# Patient Record
Sex: MALE | Race: WHITE | NOT HISPANIC OR LATINO | Employment: FULL TIME | URBAN - METROPOLITAN AREA
[De-identification: names, ages, dates, MRNs, and addresses within clinical notes are randomized per-mention and may not be internally consistent; named-entity substitution may affect disease eponyms.]

---

## 2021-10-28 ENCOUNTER — TRANSCRIBE ORDERS (OUTPATIENT)
Dept: ADMINISTRATIVE | Facility: HOSPITAL | Age: 46
End: 2021-10-28

## 2021-10-28 DIAGNOSIS — R07.9 CHEST PAIN, UNSPECIFIED TYPE: Primary | ICD-10-CM

## 2021-11-26 ENCOUNTER — APPOINTMENT (OUTPATIENT)
Dept: NUCLEAR MEDICINE | Facility: HOSPITAL | Age: 46
End: 2021-11-26

## 2021-12-22 ENCOUNTER — APPOINTMENT (OUTPATIENT)
Dept: NUCLEAR MEDICINE | Facility: HOSPITAL | Age: 46
End: 2021-12-22

## 2022-04-18 ENCOUNTER — OFFICE VISIT (OUTPATIENT)
Dept: CARDIOLOGY | Facility: CLINIC | Age: 47
End: 2022-04-18

## 2022-04-18 VITALS
DIASTOLIC BLOOD PRESSURE: 93 MMHG | OXYGEN SATURATION: 77 % | HEIGHT: 65 IN | HEART RATE: 84 BPM | WEIGHT: 203 LBS | RESPIRATION RATE: 18 BRPM | BODY MASS INDEX: 33.82 KG/M2 | SYSTOLIC BLOOD PRESSURE: 156 MMHG

## 2022-04-18 DIAGNOSIS — E78.5 HYPERLIPIDEMIA LDL GOAL <100: ICD-10-CM

## 2022-04-18 DIAGNOSIS — E78.2 ELEVATED CHOLESTEROL WITH HIGH TRIGLYCERIDES: ICD-10-CM

## 2022-04-18 DIAGNOSIS — R07.9 CHEST PAIN, UNSPECIFIED TYPE: Primary | ICD-10-CM

## 2022-04-18 PROCEDURE — 99203 OFFICE O/P NEW LOW 30 MIN: CPT | Performed by: INTERNAL MEDICINE

## 2022-04-18 PROCEDURE — 93000 ELECTROCARDIOGRAM COMPLETE: CPT | Performed by: INTERNAL MEDICINE

## 2022-04-18 RX ORDER — TIZANIDINE 4 MG/1
TABLET ORAL
COMMUNITY
Start: 2022-02-21

## 2022-04-18 NOTE — PROGRESS NOTES
"Cardiology Office Visit      Encounter Date:  04/18/2022    Patient ID:   Rodolfo Jacobsen is a 46 y.o. male.    Reason For Followup:  Chest pain  Hyperlipidemia    Brief Clinical History:  Dear Maximiliano Jesus MD    I had the pleasure of seeing Rodolfo Jacobsen today. As you are well aware, this is a 46 y.o. male past medical history that is significant for family history of coronary artery disease having some intermittent chest pain for the last several months progressively getting worse recently    Interval History:  Intermittent substernal chest pain with radiation to the neck  No dizziness no syncope no orthopnea no PND  No cardiac work-up or evaluation  \No orthopnea no PND  Worsening of the symptoms in the last several weeks    Assessment & Plan    Impressions:  Chest pain  Shortness of breath  Hyperlipidemia with triglycerides more than 600  Family history of kidney artery disease    Recommendations:  Schedule for echocardiogram to rule out pericardial pathology with intermittent ongoing long-term symptoms that lasted for a year rule out any pericardial effusion or structural heart disease contributing to patient's symptoms  Schedule for a plain treadmill stress test for further stratification with family history of coronary artery disease and intermittent chest discomfort  Need for exercise and dietary changes for hypertriglyceridemia and possible need for pharmacological therapy reviewed and discussed with patient  He prefers to try lifestyle changes along with fish oil supplements 1 g p.o. twice daily  Recheck lipids in 4 weeks  Continue current medical therapy continue aggressive risk factor modification  Follow-up in office in 3 months    Objective:    Vitals:  Vitals:    04/18/22 1512   BP: 156/93   BP Location: Left arm   Patient Position: Sitting   Cuff Size: Large Adult   Pulse: 84   Resp: 18   SpO2: (!) 77%   Weight: 92.1 kg (203 lb)   Height: 165.1 cm (65\")       Physical Exam:    General: Alert, " cooperative, no distress, appears stated age  Head:  Normocephalic, atraumatic, mucous membranes moist  Eyes:  Conjunctiva/corneas clear, EOM's intact     Neck:  Supple,  no adenopathy;      Lungs: Clear to auscultation bilaterally, no wheezes rhonchi rales are noted  Chest wall: No tenderness  Heart::  Regular rate and rhythm, S1 and S2 normal, no murmur, rub or gallop  Abdomen: Soft, non-tender, nondistended bowel sounds active  Extremities: No cyanosis, clubbing, or edema  Pulses: 2+ and symmetric all extremities  Skin:  No rashes or lesions  Neuro/psych: A&O x3. CN II through XII are grossly intact with appropriate affect      Allergies:  No Known Allergies    Medication Review:     Current Outpatient Medications:   •  tiZANidine (ZANAFLEX) 4 MG tablet, TAKE ONE-HALF TABLET BY MOUTH IN THE AFTERNOON AND ONE WHOLE TABLET BY MOUTH AT BEDTIME, Disp: , Rfl:     Family History:  Family History   Problem Relation Age of Onset   • Heart disease Father        Past Medical History:  History reviewed. No pertinent past medical history.    Past surgical History:  Past Surgical History:   Procedure Laterality Date   • WISDOM TOOTH EXTRACTION         Social History:  Social History     Socioeconomic History   • Marital status:    Tobacco Use   • Smoking status: Never Smoker   • Smokeless tobacco: Never Used   Vaping Use   • Vaping Use: Never used   Substance and Sexual Activity   • Alcohol use: Never   • Drug use: Never   • Sexual activity: Yes       Review of Systems:  The following systems were reviewed as they relate to the cardiovascular system: Constitutional, Eyes, ENT, Cardiovascular, Respiratory, Gastrointestinal, Integumentary, Neurological, Psychiatric, Hematologic, Endocrine, Musculoskeletal, and Genitourinary. The pertinent cardiovascular findings are reported above with all other cardiovascular points within those systems being negative.    Diagnostic Study Review:     Current Electrocardiogram:    ECG 12  Lead    Date/Time: 4/18/2022 3:58 PM  Performed by: Danielle Culver MD  Authorized by: Danielle Culver MD   Comparison: compared with previous ECG   Similar to previous ECG  Rhythm: sinus rhythm  Rate: normal  BPM: 84  Conduction: conduction normal  QRS axis: normal  Other findings: non-specific ST-T wave changes    Clinical impression: abnormal EKG              NOTE: The following portions of the patient's history were reviewed and updated this visit as appropriate: allergies, current medications, past family history, past medical history, past social history, past surgical history and problem list.

## 2022-06-06 ENCOUNTER — HOSPITAL ENCOUNTER (OUTPATIENT)
Dept: CARDIOLOGY | Facility: HOSPITAL | Age: 47
Discharge: HOME | End: 2022-06-06

## 2022-06-06 VITALS
BODY MASS INDEX: 33.82 KG/M2 | WEIGHT: 203 LBS | SYSTOLIC BLOOD PRESSURE: 149 MMHG | HEART RATE: 80 BPM | HEIGHT: 65 IN | DIASTOLIC BLOOD PRESSURE: 98 MMHG

## 2022-06-06 DIAGNOSIS — R07.9 CHEST PAIN, UNSPECIFIED TYPE: ICD-10-CM

## 2022-06-06 PROCEDURE — 93306 TTE W/DOPPLER COMPLETE: CPT

## 2022-06-06 PROCEDURE — 93306 TTE W/DOPPLER COMPLETE: CPT | Performed by: INTERNAL MEDICINE

## 2022-06-06 PROCEDURE — 93016 CV STRESS TEST SUPVJ ONLY: CPT | Performed by: INTERNAL MEDICINE

## 2022-06-06 PROCEDURE — 93018 CV STRESS TEST I&R ONLY: CPT | Performed by: INTERNAL MEDICINE

## 2022-06-06 PROCEDURE — 93017 CV STRESS TEST TRACING ONLY: CPT

## 2022-06-10 LAB
ASCENDING AORTA: 2.6 CM
BH CV ECHO MEAS - ACS: 1.88 CM
BH CV ECHO MEAS - AO MAX PG: 7.1 MMHG
BH CV ECHO MEAS - AO MEAN PG: 3.8 MMHG
BH CV ECHO MEAS - AO ROOT DIAM: 2.9 CM
BH CV ECHO MEAS - AO V2 MAX: 132.6 CM/SEC
BH CV ECHO MEAS - AO V2 VTI: 23.9 CM
BH CV ECHO MEAS - AVA(I,D): 2.38 CM2
BH CV ECHO MEAS - EDV(CUBED): 113.7 ML
BH CV ECHO MEAS - EDV(MOD-SP2): 106.3 ML
BH CV ECHO MEAS - EDV(MOD-SP4): 92.9 ML
BH CV ECHO MEAS - EF(MOD-BP): 58 %
BH CV ECHO MEAS - EF(MOD-SP2): 60.8 %
BH CV ECHO MEAS - EF(MOD-SP4): 54 %
BH CV ECHO MEAS - ESV(CUBED): 40.1 ML
BH CV ECHO MEAS - ESV(MOD-SP2): 41.7 ML
BH CV ECHO MEAS - ESV(MOD-SP4): 42.7 ML
BH CV ECHO MEAS - FS: 29.4 %
BH CV ECHO MEAS - IVS/LVPW: 0.97 CM
BH CV ECHO MEAS - IVSD: 1.07 CM
BH CV ECHO MEAS - LA DIMENSION: 3 CM
BH CV ECHO MEAS - LAT PEAK E' VEL: 12 CM/SEC
BH CV ECHO MEAS - LV DIASTOLIC VOL/BSA (35-75): 46.7 CM2
BH CV ECHO MEAS - LV MASS(C)D: 192.5 GRAMS
BH CV ECHO MEAS - LV MAX PG: 4.4 MMHG
BH CV ECHO MEAS - LV MEAN PG: 2.5 MMHG
BH CV ECHO MEAS - LV SYSTOLIC VOL/BSA (12-30): 21.5 CM2
BH CV ECHO MEAS - LV V1 MAX: 104.8 CM/SEC
BH CV ECHO MEAS - LV V1 VTI: 20.1 CM
BH CV ECHO MEAS - LVIDD: 4.8 CM
BH CV ECHO MEAS - LVIDS: 3.4 CM
BH CV ECHO MEAS - LVOT AREA: 2.8 CM2
BH CV ECHO MEAS - LVOT DIAM: 1.9 CM
BH CV ECHO MEAS - LVPWD: 1.1 CM
BH CV ECHO MEAS - MED PEAK E' VEL: 8 CM/SEC
BH CV ECHO MEAS - MV A MAX VEL: 62.9 CM/SEC
BH CV ECHO MEAS - MV DEC SLOPE: 374.5 CM/SEC2
BH CV ECHO MEAS - MV DEC TIME: 0.22 MSEC
BH CV ECHO MEAS - MV E MAX VEL: 80.8 CM/SEC
BH CV ECHO MEAS - MV E/A: 1.28
BH CV ECHO MEAS - MV MAX PG: 2.6 MMHG
BH CV ECHO MEAS - MV MEAN PG: 1.11 MMHG
BH CV ECHO MEAS - MV P1/2T: 47 MSEC
BH CV ECHO MEAS - MV V2 VTI: 19.1 CM
BH CV ECHO MEAS - MVA(P1/2T): 4.7 CM2
BH CV ECHO MEAS - MVA(VTI): 3 CM2
BH CV ECHO MEAS - PA ACC SLOPE: 520 CM/SEC2
BH CV ECHO MEAS - PA ACC TIME: 0.13 SEC
BH CV ECHO MEAS - PA PR(ACCEL): 18.8 MMHG
BH CV ECHO MEAS - PA V2 MAX: 105.4 CM/SEC
BH CV ECHO MEAS - PULM A REVS DUR: 0.11 SEC
BH CV ECHO MEAS - PULM A REVS VEL: 34 CM/SEC
BH CV ECHO MEAS - PULM DIAS VEL: 55.1 CM/SEC
BH CV ECHO MEAS - PULM S/D: 0.99
BH CV ECHO MEAS - PULM SYS VEL: 54.8 CM/SEC
BH CV ECHO MEAS - RAP SYSTOLE: 3 MMHG
BH CV ECHO MEAS - RV MAX PG: 2.03 MMHG
BH CV ECHO MEAS - RV V1 MAX: 71.3 CM/SEC
BH CV ECHO MEAS - RV V1 VTI: 14.9 CM
BH CV ECHO MEAS - RVSP: 28.6 MMHG
BH CV ECHO MEAS - SI(MOD-SP2): 32.4 ML/M2
BH CV ECHO MEAS - SI(MOD-SP4): 25.2 ML/M2
BH CV ECHO MEAS - SUP REN AO DIAM: 2 CM
BH CV ECHO MEAS - SV(LVOT): 56.8 ML
BH CV ECHO MEAS - SV(MOD-SP2): 64.6 ML
BH CV ECHO MEAS - SV(MOD-SP4): 50.2 ML
BH CV ECHO MEAS - TAPSE (>1.6): 2.2 CM
BH CV ECHO MEAS - TR MAX PG: 25.6 MMHG
BH CV ECHO MEAS - TR MAX VEL: 252.5 CM/SEC
BH CV ECHO MEASUREMENTS AVERAGE E/E' RATIO: 8.08
BH CV STRESS BP STAGE 1: NORMAL
BH CV STRESS BP STAGE 2: NORMAL
BH CV STRESS BP STAGE 3: NORMAL
BH CV STRESS DURATION MIN STAGE 1: 3
BH CV STRESS DURATION MIN STAGE 2: 3
BH CV STRESS DURATION MIN STAGE 3: 1
BH CV STRESS DURATION SEC STAGE 1: 0
BH CV STRESS DURATION SEC STAGE 2: 0
BH CV STRESS DURATION SEC STAGE 3: 26
BH CV STRESS GRADE STAGE 1: 10
BH CV STRESS GRADE STAGE 2: 12
BH CV STRESS GRADE STAGE 3: 14
BH CV STRESS HR STAGE 1: 115
BH CV STRESS HR STAGE 2: 136
BH CV STRESS HR STAGE 3: 150
BH CV STRESS METS STAGE 1: 5
BH CV STRESS METS STAGE 2: 7.5
BH CV STRESS METS STAGE 3: 10.1
BH CV STRESS PROTOCOL 1: NORMAL
BH CV STRESS RECOVERY BP: NORMAL MMHG
BH CV STRESS RECOVERY HR: 113 BPM
BH CV STRESS SPEED STAGE 1: 1.7
BH CV STRESS SPEED STAGE 2: 2.5
BH CV STRESS SPEED STAGE 3: 3.4
BH CV STRESS STAGE 1: 1
BH CV STRESS STAGE 2: 2
BH CV STRESS STAGE 3: 3
BH CV VAS BP LEFT ARM: NORMAL MMHG
BH CV XLRA - RV BASE: 3.2 CM
BH CV XLRA - RV MID: 2.2 CM
BH CV XLRA - TDI S': 15 CM/SEC
IVRT: 71 MSEC
LEFT ATRIUM VOLUME INDEX: 18 ML/M2
LEFT ATRIUM VOLUME: 36 ML
MAXIMAL PREDICTED HEART RATE: 176 BPM
MAXIMAL PREDICTED HEART RATE: 176 BPM
PERCENT MAX PREDICTED HR: 85.23 %
STRESS BASELINE BP: NORMAL MMHG
STRESS BASELINE HR: 86 BPM
STRESS PERCENT HR: 100 %
STRESS POST ESTIMATED WORKLOAD: 10.1 METS
STRESS POST EXERCISE DUR MIN: 7 MIN
STRESS POST EXERCISE DUR SEC: 25 SEC
STRESS POST PEAK BP: NORMAL MMHG
STRESS POST PEAK HR: 150 BPM
STRESS TARGET HR: 150 BPM
STRESS TARGET HR: 150 BPM

## 2022-06-13 ENCOUNTER — TELEPHONE (OUTPATIENT)
Dept: CARDIOLOGY | Facility: CLINIC | Age: 47
End: 2022-06-13

## 2022-06-13 ENCOUNTER — CLINICAL SUPPORT (OUTPATIENT)
Dept: FAMILY MEDICINE CLINIC | Facility: CLINIC | Age: 47
End: 2022-06-13

## 2022-06-13 DIAGNOSIS — R07.9 CHEST PAIN, UNSPECIFIED TYPE: ICD-10-CM

## 2022-06-13 DIAGNOSIS — E78.5 HYPERLIPIDEMIA LDL GOAL <100: ICD-10-CM

## 2022-06-13 PROCEDURE — 80061 LIPID PANEL: CPT | Performed by: INTERNAL MEDICINE

## 2022-06-13 PROCEDURE — 36415 COLL VENOUS BLD VENIPUNCTURE: CPT | Performed by: NURSE PRACTITIONER

## 2022-06-13 NOTE — PROGRESS NOTES
Venipuncture Blood Specimen Collection  Venipuncture performed in R arm by RAND CANCINO MA with good hemostasis. Patient tolerated the procedure well without complications.   06/13/22   RAND CANCINO MA

## 2022-06-13 NOTE — TELEPHONE ENCOUNTER
Unable to reach pt - mailbox was full and unable to receive messages.     ----- Message from Danielle Culver MD sent at 6/12/2022 10:43 AM EDT -----  Normal stress test

## 2022-06-14 ENCOUNTER — TELEPHONE (OUTPATIENT)
Dept: CARDIOLOGY | Facility: CLINIC | Age: 47
End: 2022-06-14

## 2022-06-14 LAB
CHOLEST SERPL-MCNC: 229 MG/DL (ref 0–200)
HDLC SERPL-MCNC: 40 MG/DL (ref 40–60)
LDLC SERPL CALC-MCNC: 131 MG/DL (ref 0–100)
LDLC/HDLC SERPL: 3.1 {RATIO}
TRIGL SERPL-MCNC: 325 MG/DL (ref 0–150)
VLDLC SERPL-MCNC: 58 MG/DL (ref 5–40)

## 2022-06-14 RX ORDER — ATORVASTATIN CALCIUM 10 MG/1
10 TABLET, FILM COATED ORAL NIGHTLY
Qty: 30 TABLET | Refills: 11 | Status: SHIPPED | OUTPATIENT
Start: 2022-06-14

## 2022-06-14 NOTE — TELEPHONE ENCOUNTER
Called and notified, patient verbalized understanding.    --------------------------    Dnaielle Culver MD Melissa, MA    Lipids are elevated   If patient is agreeable consider starting patient on Lipitor 10 mg p.o. nightly   If he is not sure I will discuss with him next office visit

## 2022-12-19 ENCOUNTER — CONSULT (OUTPATIENT)
Dept: CARDIOLOGY | Facility: CLINIC | Age: 47
End: 2022-12-19
Payer: COMMERCIAL

## 2022-12-19 VITALS
HEIGHT: 65 IN | SYSTOLIC BLOOD PRESSURE: 148 MMHG | OXYGEN SATURATION: 99 % | HEART RATE: 80 BPM | DIASTOLIC BLOOD PRESSURE: 95 MMHG | WEIGHT: 199 LBS | BODY MASS INDEX: 33.15 KG/M2

## 2022-12-19 DIAGNOSIS — R07.9 CHEST PAIN, UNSPECIFIED TYPE: Primary | ICD-10-CM

## 2022-12-19 PROCEDURE — 99213 OFFICE O/P EST LOW 20 MIN: CPT | Performed by: INTERNAL MEDICINE

## 2022-12-19 RX ORDER — NICOTINE POLACRILEX 4 MG/1
1 GUM, CHEWING ORAL DAILY
COMMUNITY
Start: 2022-12-07

## 2022-12-19 NOTE — LETTER
January 8, 2023     STEPHY Nascimento  [address] [facility] [place] [address]    Patient: Rodolfo Jacobsen   YOB: 1975   Date of Visit: 12/19/2022       Dear STEPHY Juan:    Thank you for referring Rodolfo Jacobsen to me for evaluation. Below are the relevant portions of my assessment and plan of care.    If you have questions, please do not hesitate to call me. I look forward to following Rodolfo along with you.         Sincerely,        Jet Christopher,         CC: No Recipients  Jet Christopher DO  01/08/23 1720  Signed  Cardiology Office Visit      Encounter Date:  12/19/2022    Patient ID:   Rodolfo Jacobsen is a 47 y.o. male.    Reason For Followup:  Chest pain    Brief Clinical History:  Dear Yoana Juan APRN    I had the pleasure of seeing Rodolfo Jacobsen today. As you are well aware, this is a 47 y.o. male with no established history of ischemic heart disease.  He does have a history of acid reflux and hyperlipidemia.  He presents today for the evaluation of chest pain.    Interval History:  He reports that he is had some chest discomfort on and off for about 2 years.  He also has some neck discomfort as well.  He denies any shortness of breath.  He denies any PND orthopnea.  He denies any syncope or near syncope.    He underwent an ischemic work-up earlier in 2022.  This included a treadmill stress test and a echocardiogram.  His treadmill stress test was negative for ischemia.  His echocardiogram demonstrated normal left ventricular systolic function with an EF of 55 to 60% and trace mitral regurgitation.    The patient reports that his discomfort is not exertional.  In fact he has no issues with a brisk walk or when he lifts weights.  He does have some discomfort along the side of his jaw.  He does have some tension type discomfort in the back of his neck.    Given that he is undergone an ischemic work-up earlier this year and my recommendation is to have a  gastroenterology work-up first.  If the GI work-up fails to demonstrate any significant gastrointestinal etiology then we can consider cardiac catheterization.    Assessment & Plan    Impressions:  Chest pain with predominantly atypical features  Acid reflux    Recommendations:  Discussed increasing acid reduction medications with PCP  Discuss endoscopy/GI referral with PCP  Follow-up in 6 months    Diagnoses and all orders for this visit:    1. Chest pain, unspecified type (Primary)          Objective:    Vitals:  Vitals:    12/19/22 1124   BP: 148/95   Pulse: 80   SpO2: 99%   Weight: 90.3 kg (199 lb)   Height: 165.1 cm (65\")     Body mass index is 33.12 kg/m².      Physical Exam:    General: Alert, cooperative, no distress, appears stated age  Head:  Normocephalic, atraumatic, mucous membranes moist  Eyes:  Conjunctiva/corneas clear, EOM's intact     Neck:  Supple,  no bruit    Lungs: Clear to auscultation bilaterally, no wheezes rhonchi rales are noted  Chest wall: No tenderness  Heart::  Regular rate and rhythm, S1 and S2 normal, 1/6 holosystolic murmur.  No rub or gallop  Abdomen: Soft, non-tender, nondistended bowel sounds active  Extremities: No cyanosis, clubbing, or edema  Pulses: 2+ and symmetric all extremities  Skin:  No rashes or lesions  Neuro/psych: A&O x3. CN II through XII are grossly intact with appropriate affect      Allergies:  No Known Allergies    Medication Review:     Current Outpatient Medications:   •  atorvastatin (LIPITOR) 10 MG tablet, Take 1 tablet by mouth Every Night., Disp: 30 tablet, Rfl: 11  •  Omeprazole 20 MG tablet delayed-release, Take 1 tablet by mouth Daily., Disp: , Rfl:   •  tiZANidine (ZANAFLEX) 4 MG tablet, TAKE ONE-HALF TABLET BY MOUTH IN THE AFTERNOON AND ONE WHOLE TABLET BY MOUTH AT BEDTIME, Disp: , Rfl:     Family History:  Family History   Problem Relation Age of Onset   • Hyperlipidemia Mother    • Hyperlipidemia Father    • Heart disease Father         He had a  stint put in 1998.   • Heart disease Paternal Grandfather        Past Medical History:  History reviewed. No pertinent past medical history.    Past Surgical History:  Past Surgical History:   Procedure Laterality Date   • WISDOM TOOTH EXTRACTION         Social History:  Social History     Socioeconomic History   • Marital status:    Tobacco Use   • Smoking status: Never   • Smokeless tobacco: Never   Vaping Use   • Vaping Use: Never used   Substance and Sexual Activity   • Alcohol use: Never   • Drug use: Never   • Sexual activity: Yes     Partners: Female       Review of Systems:  The following systems were reviewed as they relate to the cardiovascular system: Constitutional, Eyes, ENT, Cardiovascular, Respiratory, Gastrointestinal, Integumentary, Neurological, Psychiatric, Hematologic, Endocrine, Musculoskeletal, and Genitourinary. The pertinent cardiovascular findings are reported above with all other cardiovascular points within those systems being negative.    Diagnostic Study Review:     Current Electrocardiogram:  Procedures no new EKG.  EKG dated 11/8/2022 demonstrates sinus rhythm with a ventricular rate of 70 bpm.    Laboratory Data:  No results found for: GLUCOSE, BUN, CREATININE, EGFRIFNONA, EGFRIFAFRI, BCR, K, CO2, CALCIUM, PROTENTOTREF, ALBUMIN, LABIL2, BILIRUBIN, AST, ALT  No results found for: GLUCOSE, CALCIUM, NA, K, CO2, CL, BUN, CREATININE, EGFRIFAFRI, EGFRIFNONA, BCR, ANIONGAP  No results found for: WBC, HGB, HCT, MCV, PLT  Lab Results   Component Value Date    CHOL 229 (H) 06/13/2022    TRIG 325 (H) 06/13/2022    HDL 40 06/13/2022     (H) 06/13/2022     No results found for: HGBA1C  No results found for: INR, PROTIME    Most Recent Echo:  Results for orders placed during the hospital encounter of 06/06/22    Adult Transthoracic Echo Complete W/ Cont if Necessary Per Protocol    Interpretation Summary  · Estimated left ventricular EF was in agreement with the calculated left  ventricular EF. Left ventricular ejection fraction appears to be 56 - 60%. Left ventricular systolic function is normal.  · Estimated right ventricular systolic pressure from tricuspid regurgitation is normal (<35 mmHg).  · Trace mitral valve regurgitation is present.       Most Recent Stress Test:  Results for orders placed during the hospital encounter of 06/06/22    Treadmill Stress Test    Interpretation Summary  · Arrhythmias were not significant during stress.  · No ECG evidence of myocardial ischemia.  · Negative clinical evidence of myocardial ischemia.  · Findings consistent with a normal ECG stress test.       Most Recent Cardiac Catheterization:   No results found for this or any previous visit.       NOTE: The following portions of the patient's note were reviewed, confirmed and/or updated this visit as appropriate: History of present illness/Interval history, physical examination, assessment & plan, allergies, current medications, past family history, past medical history, past social history, past surgical history and problem list.

## 2022-12-19 NOTE — PROGRESS NOTES
Cardiology Office Visit      Encounter Date:  12/19/2022    Patient ID:   Rodolfo Jacobsen is a 47 y.o. male.    Reason For Followup:  Chest pain    Brief Clinical History:  Dear Yoana Juan APRN    I had the pleasure of seeing Rodolfo Jacobsen today. As you are well aware, this is a 47 y.o. male with no established history of ischemic heart disease.  He does have a history of acid reflux and hyperlipidemia.  He presents today for the evaluation of chest pain.    Interval History:  He reports that he is had some chest discomfort on and off for about 2 years.  He also has some neck discomfort as well.  He denies any shortness of breath.  He denies any PND orthopnea.  He denies any syncope or near syncope.    He underwent an ischemic work-up earlier in 2022.  This included a treadmill stress test and a echocardiogram.  His treadmill stress test was negative for ischemia.  His echocardiogram demonstrated normal left ventricular systolic function with an EF of 55 to 60% and trace mitral regurgitation.    The patient reports that his discomfort is not exertional.  In fact he has no issues with a brisk walk or when he lifts weights.  He does have some discomfort along the side of his jaw.  He does have some tension type discomfort in the back of his neck.    Given that he is undergone an ischemic work-up earlier this year and my recommendation is to have a gastroenterology work-up first.  If the GI work-up fails to demonstrate any significant gastrointestinal etiology then we can consider cardiac catheterization.    Assessment & Plan    Impressions:  Chest pain with predominantly atypical features  Acid reflux    Recommendations:  Discussed increasing acid reduction medications with PCP  Discuss endoscopy/GI referral with PCP  Follow-up in 6 months    Diagnoses and all orders for this visit:    1. Chest pain, unspecified type (Primary)          Objective:    Vitals:  Vitals:    12/19/22 1124   BP: 148/95   Pulse: 80    SpO2: 99%   Weight: 90.3 kg (199 lb)   Height: 165.1 cm (65\")     Body mass index is 33.12 kg/m².      Physical Exam:    General: Alert, cooperative, no distress, appears stated age  Head:  Normocephalic, atraumatic, mucous membranes moist  Eyes:  Conjunctiva/corneas clear, EOM's intact     Neck:  Supple,  no bruit    Lungs: Clear to auscultation bilaterally, no wheezes rhonchi rales are noted  Chest wall: No tenderness  Heart::  Regular rate and rhythm, S1 and S2 normal, 1/6 holosystolic murmur.  No rub or gallop  Abdomen: Soft, non-tender, nondistended bowel sounds active  Extremities: No cyanosis, clubbing, or edema  Pulses: 2+ and symmetric all extremities  Skin:  No rashes or lesions  Neuro/psych: A&O x3. CN II through XII are grossly intact with appropriate affect      Allergies:  No Known Allergies    Medication Review:     Current Outpatient Medications:   •  atorvastatin (LIPITOR) 10 MG tablet, Take 1 tablet by mouth Every Night., Disp: 30 tablet, Rfl: 11  •  Omeprazole 20 MG tablet delayed-release, Take 1 tablet by mouth Daily., Disp: , Rfl:   •  tiZANidine (ZANAFLEX) 4 MG tablet, TAKE ONE-HALF TABLET BY MOUTH IN THE AFTERNOON AND ONE WHOLE TABLET BY MOUTH AT BEDTIME, Disp: , Rfl:     Family History:  Family History   Problem Relation Age of Onset   • Hyperlipidemia Mother    • Hyperlipidemia Father    • Heart disease Father         He had a stint put in 1998.   • Heart disease Paternal Grandfather        Past Medical History:  History reviewed. No pertinent past medical history.    Past Surgical History:  Past Surgical History:   Procedure Laterality Date   • WISDOM TOOTH EXTRACTION         Social History:  Social History     Socioeconomic History   • Marital status:    Tobacco Use   • Smoking status: Never   • Smokeless tobacco: Never   Vaping Use   • Vaping Use: Never used   Substance and Sexual Activity   • Alcohol use: Never   • Drug use: Never   • Sexual activity: Yes     Partners: Female        Review of Systems:  The following systems were reviewed as they relate to the cardiovascular system: Constitutional, Eyes, ENT, Cardiovascular, Respiratory, Gastrointestinal, Integumentary, Neurological, Psychiatric, Hematologic, Endocrine, Musculoskeletal, and Genitourinary. The pertinent cardiovascular findings are reported above with all other cardiovascular points within those systems being negative.    Diagnostic Study Review:     Current Electrocardiogram:  Procedures no new EKG.  EKG dated 11/8/2022 demonstrates sinus rhythm with a ventricular rate of 70 bpm.    Laboratory Data:  No results found for: GLUCOSE, BUN, CREATININE, EGFRIFNONA, EGFRIFAFRI, BCR, K, CO2, CALCIUM, PROTENTOTREF, ALBUMIN, LABIL2, BILIRUBIN, AST, ALT  No results found for: GLUCOSE, CALCIUM, NA, K, CO2, CL, BUN, CREATININE, EGFRIFAFRI, EGFRIFNONA, BCR, ANIONGAP  No results found for: WBC, HGB, HCT, MCV, PLT  Lab Results   Component Value Date    CHOL 229 (H) 06/13/2022    TRIG 325 (H) 06/13/2022    HDL 40 06/13/2022     (H) 06/13/2022     No results found for: HGBA1C  No results found for: INR, PROTIME    Most Recent Echo:  Results for orders placed during the hospital encounter of 06/06/22    Adult Transthoracic Echo Complete W/ Cont if Necessary Per Protocol    Interpretation Summary  · Estimated left ventricular EF was in agreement with the calculated left ventricular EF. Left ventricular ejection fraction appears to be 56 - 60%. Left ventricular systolic function is normal.  · Estimated right ventricular systolic pressure from tricuspid regurgitation is normal (<35 mmHg).  · Trace mitral valve regurgitation is present.       Most Recent Stress Test:  Results for orders placed during the hospital encounter of 06/06/22    Treadmill Stress Test    Interpretation Summary  · Arrhythmias were not significant during stress.  · No ECG evidence of myocardial ischemia.  · Negative clinical evidence of myocardial ischemia.  ·  Findings consistent with a normal ECG stress test.       Most Recent Cardiac Catheterization:   No results found for this or any previous visit.       NOTE: The following portions of the patient's note were reviewed, confirmed and/or updated this visit as appropriate: History of present illness/Interval history, physical examination, assessment & plan, allergies, current medications, past family history, past medical history, past social history, past surgical history and problem list.

## 2022-12-22 ENCOUNTER — PATIENT ROUNDING (BHMG ONLY) (OUTPATIENT)
Dept: CARDIOLOGY | Facility: CLINIC | Age: 47
End: 2022-12-22

## 2022-12-22 NOTE — PROGRESS NOTES
December 22, 2022    Hello, may I speak with Rodolfo Jacobsen?    My name is Corine    I am  with [facility] [address] [place] [address].    Before we get started may I verify your date of birth? 1975    I am calling to officially welcome you to our practice and ask about your recent visit. Is this a good time to talk? Yes    Tell me about your visit with us. What things went well?  Really liked Dr. Christopher.  Took his time, had a lengthy visit.  Makes you comfortable.  My mother and father come there.  Really likes the [place] office.  Everyone is so nice.     We're always looking for ways to make our patients' experiences even better. Do you have recommendations on ways we may improve?   No    Overall were you satisfied with your first v{isit to our practice? Yes     I appreciate you taking the time to speak with me today. Is there anything else I can do for you? No    Thank you, and have a great day.

## 2023-06-19 ENCOUNTER — OFFICE VISIT (OUTPATIENT)
Dept: CARDIOLOGY | Facility: CLINIC | Age: 48
End: 2023-06-19
Payer: COMMERCIAL

## 2023-06-19 VITALS
OXYGEN SATURATION: 98 % | WEIGHT: 209 LBS | SYSTOLIC BLOOD PRESSURE: 128 MMHG | HEIGHT: 65 IN | BODY MASS INDEX: 34.82 KG/M2 | HEART RATE: 77 BPM | DIASTOLIC BLOOD PRESSURE: 79 MMHG

## 2023-06-19 DIAGNOSIS — R07.9 CHEST PAIN, UNSPECIFIED TYPE: Primary | ICD-10-CM

## 2023-06-19 DIAGNOSIS — E78.2 ELEVATED CHOLESTEROL WITH HIGH TRIGLYCERIDES: ICD-10-CM

## 2023-06-19 PROCEDURE — 93000 ELECTROCARDIOGRAM COMPLETE: CPT | Performed by: INTERNAL MEDICINE

## 2023-06-19 PROCEDURE — 99214 OFFICE O/P EST MOD 30 MIN: CPT | Performed by: INTERNAL MEDICINE

## 2023-06-19 RX ORDER — METOPROLOL SUCCINATE 25 MG/1
25 TABLET, EXTENDED RELEASE ORAL DAILY
Qty: 2 TABLET | Refills: 0 | Status: SHIPPED | OUTPATIENT
Start: 2023-06-19

## 2023-06-19 NOTE — PROGRESS NOTES
Cardiology Office Visit      Encounter Date:  06/19/2023    Patient ID:   Rodolfo Jacobsen is a 47 y.o. male.    Reason For Followup:  Chest pain    Brief Clinical History:  Dear Yoana Juan APRN    I had the pleasure of seeing Rodolfo Jacobsen today. As you are well aware, this is a 47 y.o. male with no established history of ischemic heart disease.  He does have a history of acid reflux and hyperlipidemia.  He presents today for the evaluation of chest pain.    Interval History:  He reports that he is had some chest discomfort on and off for about 2 years.  He also has some neck discomfort as well.  He denies any shortness of breath.  He denies any PND orthopnea.  He denies any syncope or near syncope.    He underwent an ischemic work-up earlier in 2022.  This included a treadmill stress test and a echocardiogram.  His treadmill stress test was negative for ischemia.  His echocardiogram demonstrated normal left ventricular systolic function with an EF of 55 to 60% and trace mitral regurgitation.    The patient reports that his chest pain is exertional.  He reports that if he does any type of cardio activity it will initiate his discomfort.  He describes it as a tightness in his windpipe that then extends to his jaw and then down into his chest.  He will have shortness of breath.  He reports that every time he mows the grass it will flareup.  He will take a brief rest and it goes away but it will almost assuredly start back up with mowing the grass.    Despite his negative treadmill stress test, his symptoms are somewhat worrisome in their description.  We discussed options and he will get a coronary CTA to evaluate for any obstructive disease.    He did not follow-up with gastroenterology.  He reports that he no longer has any acid reflux.  The discomfort that he describes is different than his acid reflux was.    Assessment & Plan    Impressions:  Chest pain with typical and atypical features  Acid  "reflux    Recommendations:  Coronary CTA     Metoprolol prescription given to administer before test  Follow-up in 6 weeks    Diagnoses and all orders for this visit:    1. Chest pain, unspecified type (Primary)  -     CT Angiogram Coronary; Future  -     ECG 12 Lead    2. Elevated cholesterol with high triglycerides  -     CT Angiogram Coronary; Future  -     ECG 12 Lead    Other orders  -     metoprolol succinate XL (TOPROL-XL) 25 MG 24 hr tablet; Take 1 tablet by mouth Daily. Take one tablet 12 hours before cat scan and another tablet on your way to the procedure  Dispense: 2 tablet; Refill: 0          Objective:    Vitals:  Vitals:    06/19/23 1050   BP: 128/79   Pulse: 77   SpO2: 98%   Weight: 94.8 kg (209 lb)   Height: 165.1 cm (65\")     Body mass index is 34.78 kg/m².      Physical Exam:    General: Alert, cooperative, no distress, appears stated age  Head:  Normocephalic, atraumatic, mucous membranes moist  Eyes:  Conjunctiva/corneas clear, EOM's intact     Neck:  Supple,  no bruit    Lungs: Clear to auscultation bilaterally, no wheezes rhonchi rales are noted  Chest wall: No tenderness  Heart::  Regular rate and rhythm, S1 and S2 normal, 1/6 holosystolic murmur.  No rub or gallop  Abdomen: Soft, non-tender, nondistended bowel sounds active  Extremities: No cyanosis, clubbing, or edema  Pulses: 2+ and symmetric all extremities  Skin:  No rashes or lesions  Neuro/psych: A&O x3. CN II through XII are grossly intact with appropriate affect      Allergies:  No Known Allergies    Medication Review:     Current Outpatient Medications:     atorvastatin (LIPITOR) 10 MG tablet, Take 1 tablet by mouth Every Night., Disp: 30 tablet, Rfl: 11    Omeprazole 20 MG tablet delayed-release, Take 20 mg by mouth Daily., Disp: , Rfl:     tiZANidine (ZANAFLEX) 4 MG tablet, TAKE ONE-HALF TABLET BY MOUTH IN THE AFTERNOON AND ONE WHOLE TABLET BY MOUTH AT BEDTIME, Disp: , Rfl:     metoprolol succinate XL (TOPROL-XL) 25 MG 24 hr " tablet, Take 1 tablet by mouth Daily. Take one tablet 12 hours before cat scan and another tablet on your way to the procedure, Disp: 2 tablet, Rfl: 0    Family History:  Family History   Problem Relation Age of Onset    Hyperlipidemia Mother     Hyperlipidemia Father     Heart disease Father         He had a stint put in 1998.    Heart disease Paternal Grandfather        Past Medical History:  Past Medical History:   Diagnosis Date    Hyperlipidemia        Past Surgical History:  Past Surgical History:   Procedure Laterality Date    WISDOM TOOTH EXTRACTION         Social History:  Social History     Socioeconomic History    Marital status:    Tobacco Use    Smoking status: Never    Smokeless tobacco: Never   Vaping Use    Vaping Use: Never used   Substance and Sexual Activity    Alcohol use: Never    Drug use: Never    Sexual activity: Yes     Partners: Female     Birth control/protection: Condom       Review of Systems:  The following systems were reviewed as they relate to the cardiovascular system: Constitutional, Eyes, ENT, Cardiovascular, Respiratory, Gastrointestinal, Integumentary, Neurological, Psychiatric, Hematologic, Endocrine, Musculoskeletal, and Genitourinary. The pertinent cardiovascular findings are reported above with all other cardiovascular points within those systems being negative.    Diagnostic Study Review:     Current Electrocardiogram:    ECG 12 Lead    Date/Time: 6/19/2023 11:32 AM  Performed by: Jet Christopher DO  Authorized by: Jet Christopher DO   Comparison: not compared with previous ECG   Previous ECG: no previous ECG available  Comments: Normal sinus rhythm with a ventricular rate of 77 bpm.  Low voltage in the precordial leads.  Borderline repolarization changes inferiorly.  Normal QT and QTc intervals.  Normal QRS axis.        Laboratory Data:  No results found for: GLUCOSE, BUN, CREATININE, EGFRIFNONA, EGFRIFAFRI, BCR, K, CO2, CALCIUM,  PROTENTOTREF, ALBUMIN, LABIL2, BILIRUBIN, AST, ALT  No results found for: GLUCOSE, CALCIUM, NA, K, CO2, CL, BUN, CREATININE, EGFRIFAFRI, EGFRIFNONA, BCR, ANIONGAP  No results found for: WBC, HGB, HCT, MCV, PLT  Lab Results   Component Value Date    CHOL 229 (H) 06/13/2022    TRIG 325 (H) 06/13/2022    HDL 40 06/13/2022     (H) 06/13/2022     No results found for: HGBA1C  No results found for: INR, PROTIME    Most Recent Echo:  Results for orders placed during the hospital encounter of 06/06/22    Adult Transthoracic Echo Complete W/ Cont if Necessary Per Protocol    Interpretation Summary  · Estimated left ventricular EF was in agreement with the calculated left ventricular EF. Left ventricular ejection fraction appears to be 56 - 60%. Left ventricular systolic function is normal.  · Estimated right ventricular systolic pressure from tricuspid regurgitation is normal (<35 mmHg).  · Trace mitral valve regurgitation is present.       Most Recent Stress Test:  Results for orders placed during the hospital encounter of 06/06/22    Treadmill Stress Test    Interpretation Summary  · Arrhythmias were not significant during stress.  · No ECG evidence of myocardial ischemia.  · Negative clinical evidence of myocardial ischemia.  · Findings consistent with a normal ECG stress test.       Most Recent Cardiac Catheterization:   No results found for this or any previous visit.       NOTE: The following portions of the patient's note were reviewed, confirmed and/or updated this visit as appropriate: History of present illness/Interval history, physical examination, assessment & plan, allergies, current medications, past family history, past medical history, past social history, past surgical history and problem list.